# Patient Record
Sex: FEMALE | Race: WHITE | Employment: UNEMPLOYED | URBAN - METROPOLITAN AREA
[De-identification: names, ages, dates, MRNs, and addresses within clinical notes are randomized per-mention and may not be internally consistent; named-entity substitution may affect disease eponyms.]

---

## 2017-01-05 ENCOUNTER — TELEPHONE (OUTPATIENT)
Dept: INTERNAL MEDICINE | Age: 67
End: 2017-01-05

## 2017-01-11 RX ORDER — METHYLPREDNISOLONE SODIUM SUCCINATE 500 MG/8ML
INJECTION INTRAMUSCULAR; INTRAVENOUS
Qty: 500 MG | Refills: 2 | Status: SHIPPED | OUTPATIENT
Start: 2017-01-11 | End: 2017-01-27 | Stop reason: SDUPTHER

## 2017-01-12 ENCOUNTER — TELEPHONE (OUTPATIENT)
Dept: INTERNAL MEDICINE | Age: 67
End: 2017-01-12

## 2017-01-27 ENCOUNTER — TELEPHONE (OUTPATIENT)
Dept: INTERNAL MEDICINE | Age: 67
End: 2017-01-27

## 2017-01-27 RX ORDER — METHYLPREDNISOLONE SODIUM SUCCINATE 500 MG/8ML
INJECTION INTRAMUSCULAR; INTRAVENOUS
Qty: 500 MG | Refills: 2 | Status: SHIPPED | OUTPATIENT
Start: 2017-01-27 | End: 2017-03-14 | Stop reason: ALTCHOICE

## 2017-02-03 ENCOUNTER — HOSPITAL ENCOUNTER (OUTPATIENT)
Dept: ONCOLOGY | Age: 67
Discharge: HOME | End: 2017-02-28
Attending: INTERNAL MEDICINE | Admitting: INTERNAL MEDICINE

## 2017-02-03 VITALS
TEMPERATURE: 98.8 F | SYSTOLIC BLOOD PRESSURE: 124 MMHG | DIASTOLIC BLOOD PRESSURE: 70 MMHG | HEART RATE: 73 BPM | RESPIRATION RATE: 16 BRPM

## 2017-02-09 RX ORDER — 0.9 % SODIUM CHLORIDE 0.9 %
1000 INTRAVENOUS SOLUTION INTRAVENOUS ONCE
Status: DISCONTINUED | OUTPATIENT
Start: 2017-02-10 | End: 2017-02-09 | Stop reason: CLARIF

## 2017-02-10 ENCOUNTER — HOSPITAL ENCOUNTER (OUTPATIENT)
Dept: ONCOLOGY | Age: 67
Discharge: HOME | End: 2017-02-10
Attending: INTERNAL MEDICINE | Admitting: INTERNAL MEDICINE

## 2017-02-17 ENCOUNTER — HOSPITAL ENCOUNTER (OUTPATIENT)
Dept: ONCOLOGY | Age: 67
Discharge: HOME | End: 2017-02-17
Attending: INTERNAL MEDICINE | Admitting: INTERNAL MEDICINE

## 2017-02-17 VITALS
TEMPERATURE: 97 F | RESPIRATION RATE: 16 BRPM | SYSTOLIC BLOOD PRESSURE: 123 MMHG | DIASTOLIC BLOOD PRESSURE: 78 MMHG | HEART RATE: 68 BPM

## 2017-03-14 ENCOUNTER — OFFICE VISIT (OUTPATIENT)
Dept: ENDOCRINOLOGY | Age: 67
End: 2017-03-14

## 2017-03-14 VITALS
BODY MASS INDEX: 20.75 KG/M2 | SYSTOLIC BLOOD PRESSURE: 135 MMHG | WEIGHT: 126.6 LBS | RESPIRATION RATE: 16 BRPM | OXYGEN SATURATION: 98 % | DIASTOLIC BLOOD PRESSURE: 78 MMHG

## 2017-03-14 DIAGNOSIS — E05.00 GRAVES' EYE DISEASE: ICD-10-CM

## 2017-03-14 DIAGNOSIS — E89.0 HYPOTHYROIDISM FOLLOWING RADIOIODINE THERAPY: Primary | ICD-10-CM

## 2017-03-14 PROCEDURE — 99213 OFFICE O/P EST LOW 20 MIN: CPT | Performed by: INTERNAL MEDICINE

## 2017-03-14 RX ORDER — LEVOTHYROXINE SODIUM 0.05 MG/1
50 TABLET ORAL DAILY
COMMUNITY
End: 2017-09-18 | Stop reason: SDUPTHER

## 2017-03-14 RX ORDER — LEVOTHYROXINE SODIUM 0.07 MG/1
75 TABLET ORAL DAILY
COMMUNITY
End: 2017-09-18 | Stop reason: SDUPTHER

## 2017-03-20 PROBLEM — E78.5 HYPERLIPIDEMIA: Status: ACTIVE | Noted: 2017-03-20

## 2017-03-28 ENCOUNTER — TELEPHONE (OUTPATIENT)
Dept: INTERNAL MEDICINE | Age: 67
End: 2017-03-28

## 2017-04-12 ENCOUNTER — OFFICE VISIT (OUTPATIENT)
Dept: INTERNAL MEDICINE | Age: 67
End: 2017-04-12

## 2017-04-12 ENCOUNTER — TELEPHONE (OUTPATIENT)
Dept: INTERNAL MEDICINE | Age: 67
End: 2017-04-12

## 2017-04-12 VITALS
DIASTOLIC BLOOD PRESSURE: 80 MMHG | HEIGHT: 66 IN | BODY MASS INDEX: 20.89 KG/M2 | WEIGHT: 130 LBS | SYSTOLIC BLOOD PRESSURE: 122 MMHG

## 2017-04-12 DIAGNOSIS — E05.00 GRAVES' EYE DISEASE: Primary | ICD-10-CM

## 2017-04-12 PROCEDURE — 99213 OFFICE O/P EST LOW 20 MIN: CPT | Performed by: INTERNAL MEDICINE

## 2017-04-12 RX ORDER — METHYLPREDNISOLONE SODIUM SUCCINATE 500 MG/8ML
250 INJECTION INTRAMUSCULAR; INTRAVENOUS WEEKLY
Qty: 250 MG | Refills: 0
Start: 2017-04-12 | End: 2017-04-13 | Stop reason: CLARIF

## 2017-04-13 RX ORDER — METHYLPREDNISOLONE SODIUM SUCCINATE 500 MG/8ML
INJECTION INTRAMUSCULAR; INTRAVENOUS
Qty: 500 MG | Refills: 0 | Status: SHIPPED | OUTPATIENT
Start: 2017-04-13 | End: 2017-07-14 | Stop reason: ALTCHOICE

## 2017-04-26 ENCOUNTER — HOSPITAL ENCOUNTER (OUTPATIENT)
Dept: ONCOLOGY | Age: 67
Discharge: HOME | End: 2017-04-30
Attending: INTERNAL MEDICINE | Admitting: INTERNAL MEDICINE

## 2017-04-28 ENCOUNTER — TELEPHONE (OUTPATIENT)
Dept: INTERNAL MEDICINE | Age: 67
End: 2017-04-28

## 2017-05-03 ENCOUNTER — HOSPITAL ENCOUNTER (OUTPATIENT)
Dept: ONCOLOGY | Age: 67
Discharge: HOME | End: 2017-05-03
Attending: INTERNAL MEDICINE | Admitting: INTERNAL MEDICINE

## 2017-05-03 VITALS
RESPIRATION RATE: 16 BRPM | DIASTOLIC BLOOD PRESSURE: 71 MMHG | HEART RATE: 87 BPM | TEMPERATURE: 97.8 F | SYSTOLIC BLOOD PRESSURE: 130 MMHG

## 2017-05-10 ENCOUNTER — HOSPITAL ENCOUNTER (OUTPATIENT)
Dept: ONCOLOGY | Age: 67
Discharge: HOME | End: 2017-05-10
Attending: INTERNAL MEDICINE | Admitting: INTERNAL MEDICINE

## 2017-05-10 VITALS
SYSTOLIC BLOOD PRESSURE: 123 MMHG | DIASTOLIC BLOOD PRESSURE: 72 MMHG | RESPIRATION RATE: 18 BRPM | TEMPERATURE: 97.2 F | HEART RATE: 73 BPM

## 2017-05-12 PROBLEM — H05.89 THYROID ASSOCIATED OPHTHALMOPATHY: Status: ACTIVE | Noted: 2017-05-12

## 2017-05-12 PROBLEM — E07.9 THYROID ASSOCIATED OPHTHALMOPATHY: Status: ACTIVE | Noted: 2017-05-12

## 2017-05-17 ENCOUNTER — HOSPITAL ENCOUNTER (OUTPATIENT)
Dept: ONCOLOGY | Age: 67
Discharge: HOME | End: 2017-05-17
Attending: INTERNAL MEDICINE | Admitting: INTERNAL MEDICINE

## 2017-05-17 VITALS
SYSTOLIC BLOOD PRESSURE: 122 MMHG | HEART RATE: 69 BPM | DIASTOLIC BLOOD PRESSURE: 67 MMHG | TEMPERATURE: 98.7 F | RESPIRATION RATE: 16 BRPM | OXYGEN SATURATION: 98 %

## 2017-05-17 RX ORDER — METHYLPREDNISOLONE SODIUM SUCCINATE 125 MG/2ML
250 INJECTION, POWDER, LYOPHILIZED, FOR SOLUTION INTRAMUSCULAR; INTRAVENOUS EVERY 6 HOURS
Status: DISCONTINUED | OUTPATIENT
Start: 2017-05-17 | End: 2017-05-17 | Stop reason: DRUGHIGH

## 2017-07-14 ENCOUNTER — OFFICE VISIT (OUTPATIENT)
Dept: ENDOCRINOLOGY | Age: 67
End: 2017-07-14

## 2017-07-14 VITALS
WEIGHT: 129 LBS | HEART RATE: 71 BPM | DIASTOLIC BLOOD PRESSURE: 71 MMHG | OXYGEN SATURATION: 97 % | RESPIRATION RATE: 16 BRPM | BODY MASS INDEX: 20.73 KG/M2 | HEIGHT: 66 IN | SYSTOLIC BLOOD PRESSURE: 123 MMHG

## 2017-07-14 DIAGNOSIS — E05.00 GRAVES' EYE DISEASE: ICD-10-CM

## 2017-07-14 DIAGNOSIS — E89.0 HYPOTHYROIDISM FOLLOWING RADIOIODINE THERAPY: Primary | ICD-10-CM

## 2017-07-14 PROCEDURE — 99213 OFFICE O/P EST LOW 20 MIN: CPT | Performed by: INTERNAL MEDICINE

## 2017-09-18 RX ORDER — LEVOTHYROXINE SODIUM 0.05 MG/1
TABLET ORAL
Qty: 45 TABLET | Refills: 3 | Status: SHIPPED | OUTPATIENT
Start: 2017-09-18 | End: 2018-09-10 | Stop reason: SDUPTHER

## 2017-09-18 RX ORDER — LEVOTHYROXINE SODIUM 0.07 MG/1
TABLET ORAL
Qty: 45 TABLET | Refills: 3 | Status: SHIPPED | OUTPATIENT
Start: 2017-09-18 | End: 2018-09-10 | Stop reason: SDUPTHER

## 2017-11-10 DIAGNOSIS — E78.00 PURE HYPERCHOLESTEROLEMIA: ICD-10-CM

## 2017-11-13 RX ORDER — ATORVASTATIN CALCIUM 20 MG/1
TABLET, FILM COATED ORAL
Qty: 90 TABLET | Refills: 3 | Status: SHIPPED | OUTPATIENT
Start: 2017-11-13 | End: 2018-11-12 | Stop reason: SDUPTHER

## 2017-11-14 ENCOUNTER — OFFICE VISIT (OUTPATIENT)
Dept: ENDOCRINOLOGY | Age: 67
End: 2017-11-14

## 2017-11-14 VITALS
DIASTOLIC BLOOD PRESSURE: 84 MMHG | RESPIRATION RATE: 16 BRPM | HEIGHT: 66 IN | BODY MASS INDEX: 21.05 KG/M2 | WEIGHT: 131 LBS | HEART RATE: 88 BPM | OXYGEN SATURATION: 96 % | SYSTOLIC BLOOD PRESSURE: 130 MMHG

## 2017-11-14 DIAGNOSIS — E89.0 HYPOTHYROIDISM FOLLOWING RADIOIODINE THERAPY: Primary | ICD-10-CM

## 2017-11-14 PROCEDURE — 99213 OFFICE O/P EST LOW 20 MIN: CPT | Performed by: INTERNAL MEDICINE

## 2017-11-14 NOTE — PROGRESS NOTES
Subjective:      58 y/o WF who is here for thyroid evaluation. She was diagnosed  with Graves disease.     Interim: Eyes puffiness/redness, no pain, no vision changes  He saw Dr. Ada Resendiz, had IV steroids, had some improvement in eyes  Iv steroids 5/17    TFT:  5/15 TSH <0.01 FT4 2.1  4/15 TSH <0.01 FT4 3.6 FT3 11.9  TPO -ve    10/14 TSH 10.99    4/15 Thyroid scan : 70% uptake, diffuse    She had 10mci on 5/1/15    Initial  complaints: Eye swelling-initial right  Now both, , no palpitations, had weight loss, no tremors  History of obstructive symptoms:  difficulty swallowing No, changes in voice/hoarseness  No.    6/15 TSH 0.010 FT4 1.09 FT3 2.13  6/15 Trab >40  8/15  TSH  >100 FT4 0.1  FT3 0.39  Started levothyroxine 100mcg  10/15 TSH 0.103 FT4 2.2     12/15 TSH 0.053 FT4 1.6  75mcg levothyroxine  3/16 TSH 17.8 FT 1.29  50mcg  6/16 TSH 2.3  75mcg alternate with 50mcg  8/16 TSH 2.1   12/16 TSH 2.0  3/17 TSH 1.96  7/17 TSH 3.89  11/17 TSH 3.8    5/16  Left Femoral Neck  -1.8   L1-L4 -1.5   Right Fem Neck -2.1    Frax 9.7  / 1.6%      Past Medical History:   Diagnosis Date    AK (actinic keratosis) 6/9/2010    Angioma 6/9/2010    [address] cancer [address] [address] 6/9/2010    Hemangioma 6/9/2010    Hyperthyroidism     underwent radioactive ablation treatment    Hypothyroidism following radioiodine therapy     Migraine 6/9/2010    Migraines     Personal history of malignant neoplasm of large intestine     S/P colectomy     S/P partial hysterectomy 1996    Shingles     On face 7/15    Skin cancer     Skin tags 6/9/2010     Past Surgical History:   Procedure Laterality Date    COLECTOMY      HYSTERECTOMY       Current Outpatient Prescriptions   Medication Sig Dispense Refill    atorvastatin (LIPITOR) 20 MG tablet TAKE 1 TABLET EVERY DAY 90 tablet 3    levothyroxine (SYNTHROID) 75 MCG tablet TAKE 1 TABLET EVERY OTHER DAY 45 tablet 3    levothyroxine (SYNTHROID) 50 MCG tablet TAKE 1 TABLET EVERY OTHER DAY 45 tablet 3  SUMAtriptan (IMITREX) 100 MG tablet Take 100 mg by mouth once as needed for Migraine      therapeutic multivitamin-minerals (THERAGRAN-M) tablet Take 1 tablet by mouth daily.  calcium citrate (CALCITRATE) 950 MG tablet Take 1 tablet by mouth daily.  Aspirin (ASPIR-81 PO) Take by mouth 1 pill twice weekly      SUMAtriptan (IMITREX) 100 MG tablet Take 1 tablet by mouth once as needed for Migraine 9 tablet 5     No current facility-administered medications for this visit. Review of Systems  Constitutional: - loss and malaise/fatigue. Negative for fever and chills. Eyes: stable, +puffiness  Cardiovascular: Negative for chest pain, palpitations and leg swelling. Gastrointestinal: Negative for nausea, vomiting and abdominal pain. Musculoskeletal: Negative for back pain. No joint pain hand swelling  Skin: Negative for itching and rash. Neurological: Negative for dizziness. Negative for tingling, tremors  Endo/Heme/Allergies: +Hair loss improved,no  weight loss  Psychiatric/Behavioral: Negative for depression and substance abuse. Objective:      /84 (Site: Left Arm, Position: Sitting, Cuff Size: Medium Adult)   Pulse 88   Resp 16   Ht 5' 6\" (1.676 m)   Wt 131 lb (59.4 kg)   SpO2 96%   Breastfeeding? No   BMI 21.14 kg/m²   Wt Readings from Last 3 Encounters:   11/14/17 131 lb (59.4 kg)   07/14/17 129 lb (58.5 kg)   04/12/17 130 lb (59 kg)       Constitutional: Well-developed, appears stated age, cooperative, in no acute distress  H/E/N/M/T:atraumatic, normocephalic, external ears, nose, lips normal without lesions   Eyes: Eyes red, +watery, extraocular movements intact. Periorbital puffiness+redness  Neck: supple, symmetrical, trachea midline   Thyroid: gland size normal  Skin: No obvious rashes or lesions present.   Psychiatric: Judgement and Insight:  judgement and insight appear normal  Neuro: Normal without focal findings, speech is normal normal slow reflexes  No

## 2018-05-15 ENCOUNTER — TELEPHONE (OUTPATIENT)
Dept: ENDOCRINOLOGY | Age: 68
End: 2018-05-15

## 2018-05-15 ENCOUNTER — OFFICE VISIT (OUTPATIENT)
Dept: ENDOCRINOLOGY | Age: 68
End: 2018-05-15

## 2018-05-15 VITALS
HEIGHT: 66 IN | HEART RATE: 67 BPM | DIASTOLIC BLOOD PRESSURE: 73 MMHG | SYSTOLIC BLOOD PRESSURE: 140 MMHG | BODY MASS INDEX: 20.86 KG/M2 | WEIGHT: 129.8 LBS | RESPIRATION RATE: 16 BRPM | OXYGEN SATURATION: 98 %

## 2018-05-15 DIAGNOSIS — E89.0 HYPOTHYROIDISM FOLLOWING RADIOIODINE THERAPY: Primary | ICD-10-CM

## 2018-05-15 PROCEDURE — 99213 OFFICE O/P EST LOW 20 MIN: CPT | Performed by: INTERNAL MEDICINE

## 2018-09-10 RX ORDER — LEVOTHYROXINE SODIUM 0.05 MG/1
TABLET ORAL
Qty: 45 TABLET | Refills: 3 | Status: SHIPPED | OUTPATIENT
Start: 2018-09-10 | End: 2019-01-24

## 2018-09-10 RX ORDER — LEVOTHYROXINE SODIUM 0.07 MG/1
TABLET ORAL
Qty: 45 TABLET | Refills: 3 | Status: SHIPPED | OUTPATIENT
Start: 2018-09-10 | End: 2019-01-23 | Stop reason: SDUPTHER

## 2018-09-10 NOTE — TELEPHONE ENCOUNTER
Pt needs scripts sent to [facility] mail order     Levothyroxine 50 mcg and 75 mcg

## 2018-10-05 ENCOUNTER — TELEPHONE (OUTPATIENT)
Dept: INTERNAL MEDICINE CLINIC | Age: 68
End: 2018-10-05

## 2018-10-05 DIAGNOSIS — Z00.00 WELL ADULT EXAM: Primary | ICD-10-CM

## 2018-10-05 NOTE — TELEPHONE ENCOUNTER
Pt is having a POV on 12. 3.18, pt would like bl wk orders placed and mailed out to her prior to the appt.  Pt will be going to [employer]

## 2018-11-12 DIAGNOSIS — E78.00 PURE HYPERCHOLESTEROLEMIA: ICD-10-CM

## 2018-11-13 ENCOUNTER — OFFICE VISIT (OUTPATIENT)
Dept: ENDOCRINOLOGY | Age: 68
End: 2018-11-13
Payer: COMMERCIAL

## 2018-11-13 VITALS
DIASTOLIC BLOOD PRESSURE: 72 MMHG | HEIGHT: 66 IN | HEART RATE: 72 BPM | RESPIRATION RATE: 16 BRPM | BODY MASS INDEX: 20.76 KG/M2 | OXYGEN SATURATION: 98 % | SYSTOLIC BLOOD PRESSURE: 139 MMHG | WEIGHT: 129.2 LBS

## 2018-11-13 DIAGNOSIS — E89.0 HYPOTHYROIDISM FOLLOWING RADIOIODINE THERAPY: Primary | ICD-10-CM

## 2018-11-13 DIAGNOSIS — E05.00 GRAVES' EYE DISEASE: ICD-10-CM

## 2018-11-13 PROCEDURE — 99213 OFFICE O/P EST LOW 20 MIN: CPT | Performed by: INTERNAL MEDICINE

## 2018-11-13 RX ORDER — ATORVASTATIN CALCIUM 20 MG/1
TABLET, FILM COATED ORAL
Qty: 90 TABLET | Refills: 3 | Status: SHIPPED | OUTPATIENT
Start: 2018-11-13

## 2018-12-03 ENCOUNTER — OFFICE VISIT (OUTPATIENT)
Dept: INTERNAL MEDICINE CLINIC | Age: 68
End: 2018-12-03
Payer: COMMERCIAL

## 2018-12-03 VITALS
BODY MASS INDEX: 20.73 KG/M2 | HEIGHT: 66 IN | WEIGHT: 129 LBS | DIASTOLIC BLOOD PRESSURE: 78 MMHG | SYSTOLIC BLOOD PRESSURE: 140 MMHG

## 2018-12-03 DIAGNOSIS — Z01.818 PRE-OP EXAM: Primary | ICD-10-CM

## 2018-12-03 DIAGNOSIS — E78.00 PURE HYPERCHOLESTEROLEMIA: ICD-10-CM

## 2018-12-03 DIAGNOSIS — E89.0 HYPOTHYROIDISM FOLLOWING RADIOIODINE THERAPY: ICD-10-CM

## 2018-12-03 DIAGNOSIS — Z12.31 SCREENING MAMMOGRAM, ENCOUNTER FOR: ICD-10-CM

## 2018-12-03 PROCEDURE — 99214 OFFICE O/P EST MOD 30 MIN: CPT | Performed by: HOSPITALIST

## 2018-12-03 ASSESSMENT — ENCOUNTER SYMPTOMS
ALLERGIC/IMMUNOLOGIC NEGATIVE: 1
GASTROINTESTINAL NEGATIVE: 1

## 2018-12-03 ASSESSMENT — PATIENT HEALTH QUESTIONNAIRE - PHQ9
SUM OF ALL RESPONSES TO PHQ QUESTIONS 1-9: 0
1. LITTLE INTEREST OR PLEASURE IN DOING THINGS: 0
2. FEELING DOWN, DEPRESSED OR HOPELESS: 0
SUM OF ALL RESPONSES TO PHQ QUESTIONS 1-9: 0
SUM OF ALL RESPONSES TO PHQ9 QUESTIONS 1 & 2: 0

## 2018-12-03 NOTE — PROGRESS NOTES
Authorizing Provider   atorvastatin (LIPITOR) 20 MG tablet TAKE 1 TABLET EVERY DAY 11/13/18  Yes Beryle Boyers, MD   levothyroxine (SYNTHROID) 75 MCG tablet TAKE 1 TABLET EVERY OTHER DAY 9/10/18  Yes Golden Oliveira MD   levothyroxine (SYNTHROID) 50 MCG tablet TAKE 1 TABLET EVERY OTHER DAY 9/10/18  Yes Golden Oliveira MD   SUMAtriptan (IMITREX) 100 MG tablet Take 100 mg by mouth once as needed for Migraine   Yes Historical Provider, MD   therapeutic multivitamin-minerals (THERAGRAN-M) tablet Take 1 tablet by mouth daily. Yes Historical Provider, MD   calcium citrate (CALCITRATE) 950 MG tablet Take 1 tablet by mouth daily. Yes Historical Provider, MD   Aspirin (ASPIR-81 PO) Take by mouth 1 pill twice weekly   Yes Historical Provider, MD       REVIEW OF SYSTEMS:  Review of Systems   Constitutional: Negative. HENT: Negative. Eyes: Positive for visual disturbance. Cardiovascular: Negative. Gastrointestinal: Negative. Endocrine: Negative. Genitourinary: Negative. Musculoskeletal: Negative. Allergic/Immunologic: Negative. Neurological: Negative. Psychiatric/Behavioral: Negative. Physical Exam:      Vitals: BP (!) 140/78   Ht 5' 6\" (1.676 m)   Wt 129 lb (58.5 kg)   LMP  (LMP Unknown)   BMI 20.82 kg/m²     Body mass index is 20.82 kg/m². Wt Readings from Last 3 Encounters:   12/03/18 129 lb (58.5 kg)   11/13/18 129 lb 3.2 oz (58.6 kg)   05/15/18 129 lb 12.8 oz (58.9 kg)     Physical Exam   Constitutional: She is oriented to person, place, and time. She appears well-developed and well-nourished. No distress. HENT:   Head: Normocephalic and atraumatic. Right Ear: External ear normal.   Left Ear: External ear normal.   Nose: Nose normal.   Mouth/Throat: Oropharynx is clear and moist. No oropharyngeal exudate. Eyes: Pupils are equal, round, and reactive to light. Conjunctivae and EOM are normal. No scleral icterus. Neck: Normal range of motion. Neck supple.  No JVD

## 2018-12-04 ENCOUNTER — TELEPHONE (OUTPATIENT)
Dept: ENDOCRINOLOGY | Age: 68
End: 2018-12-04

## 2018-12-07 ENCOUNTER — TELEPHONE (OUTPATIENT)
Dept: INTERNAL MEDICINE CLINIC | Age: 68
End: 2018-12-07

## 2019-01-23 ENCOUNTER — TELEPHONE (OUTPATIENT)
Dept: ENDOCRINOLOGY | Age: 69
End: 2019-01-23

## 2019-01-23 RX ORDER — LEVOTHYROXINE SODIUM 0.07 MG/1
TABLET ORAL
Qty: 90 TABLET | Refills: 1 | Status: SHIPPED | OUTPATIENT
Start: 2019-01-23 | End: 2019-01-24 | Stop reason: SDUPTHER

## 2019-01-23 RX ORDER — LEVOTHYROXINE SODIUM 0.07 MG/1
TABLET ORAL
Qty: 45 TABLET | Refills: 3 | Status: SHIPPED | OUTPATIENT
Start: 2019-01-23 | End: 2019-01-23 | Stop reason: SDUPTHER

## 2019-01-24 RX ORDER — LEVOTHYROXINE SODIUM 0.07 MG/1
TABLET ORAL
Qty: 90 TABLET | Refills: 1 | Status: SHIPPED | OUTPATIENT
Start: 2019-01-24 | End: 2019-07-22 | Stop reason: SDUPTHER

## 2019-04-23 ENCOUNTER — TELEPHONE (OUTPATIENT)
Dept: ENDOCRINOLOGY | Age: 69
End: 2019-04-23

## 2019-04-23 NOTE — TELEPHONE ENCOUNTER
Patient called and said that her new internist, Dr. Doreen Art did not labs on her and found that her thyroid was slightly low, at 0.217. She wanted to let Dr. Clement Corado know in case this changes her medication or if she needs to be seen sooner. She also left Dr. Azar Balloon number in case it is needed, it is [phone].

## 2019-07-22 RX ORDER — LEVOTHYROXINE SODIUM 0.07 MG/1
TABLET ORAL
Qty: 90 TABLET | Refills: 1 | Status: SHIPPED | OUTPATIENT
Start: 2019-07-22 | End: 2020-01-20

## 2019-11-13 ENCOUNTER — OFFICE VISIT (OUTPATIENT)
Dept: ENDOCRINOLOGY | Age: 69
End: 2019-11-13
Payer: COMMERCIAL

## 2019-11-13 VITALS
HEIGHT: 66 IN | SYSTOLIC BLOOD PRESSURE: 129 MMHG | BODY MASS INDEX: 20.73 KG/M2 | HEART RATE: 71 BPM | WEIGHT: 129 LBS | OXYGEN SATURATION: 98 % | DIASTOLIC BLOOD PRESSURE: 71 MMHG

## 2019-11-13 DIAGNOSIS — E05.00 GRAVES DISEASE: ICD-10-CM

## 2019-11-13 DIAGNOSIS — E03.9 ACQUIRED HYPOTHYROIDISM: Primary | ICD-10-CM

## 2019-11-13 PROCEDURE — 99213 OFFICE O/P EST LOW 20 MIN: CPT | Performed by: INTERNAL MEDICINE

## 2020-01-20 RX ORDER — LEVOTHYROXINE SODIUM 0.07 MG/1
TABLET ORAL
Qty: 90 TABLET | Refills: 1 | Status: SHIPPED | OUTPATIENT
Start: 2020-01-20 | End: 2020-08-03

## 2020-08-03 RX ORDER — LEVOTHYROXINE SODIUM 0.07 MG/1
TABLET ORAL
Qty: 90 TABLET | Refills: 1 | Status: SHIPPED | OUTPATIENT
Start: 2020-08-03 | End: 2020-11-09

## 2020-11-09 ENCOUNTER — OFFICE VISIT (OUTPATIENT)
Dept: ENDOCRINOLOGY | Age: 70
End: 2020-11-09
Payer: COMMERCIAL

## 2020-11-09 VITALS
SYSTOLIC BLOOD PRESSURE: 143 MMHG | HEIGHT: 66 IN | BODY MASS INDEX: 20.93 KG/M2 | DIASTOLIC BLOOD PRESSURE: 76 MMHG | HEART RATE: 71 BPM | WEIGHT: 130.2 LBS | RESPIRATION RATE: 18 BRPM | OXYGEN SATURATION: 96 %

## 2020-11-09 PROCEDURE — 99213 OFFICE O/P EST LOW 20 MIN: CPT | Performed by: INTERNAL MEDICINE

## 2020-11-09 RX ORDER — LEVOTHYROXINE SODIUM 0.07 MG/1
TABLET ORAL
Qty: 90 TABLET | Refills: 1 | Status: CANCELLED | OUTPATIENT
Start: 2020-11-09

## 2020-11-09 RX ORDER — LEVOTHYROXINE SODIUM 0.05 MG/1
50 TABLET ORAL DAILY
Qty: 90 TABLET | Refills: 1 | Status: SHIPPED | OUTPATIENT
Start: 2020-11-09 | End: 2021-04-29

## 2020-11-09 NOTE — PROGRESS NOTES
Subjective:      60 y/o WF who is here for thyroid evaluation. She was diagnosed  with Graves disease.     Interim: Eyes puffiness/redness is stable   no pain, no vision changes       saw Dr. Nam Krueger, had IV steroids, had some improvement in eyes  Iv steroids 5/17    TFT:  5/15 TSH <0.01 FT4 2.1  4/15 TSH <0.01 FT4 3.6 FT3 11.9  TPO -ve    10/14 TSH 10.99    4/15 Thyroid scan : 70% uptake, diffuse    She had 10mci on 5/1/15    Initial  complaints: Eye swelling-initial right  Now both, , no palpitations, had weight loss, no tremors  History of obstructive symptoms:  difficulty swallowing No, changes in voice/hoarseness  No.    She has post ablative hypothyroidism    Mild, controlled    No worsening factors    6/15 TSH 0.010 FT4 1.09 FT3 2.13  6/15 Trab >40  8/15  TSH  >100 FT4 0.1  FT3 0.39  Started levothyroxine 100mcg  10/15 TSH 0.103 FT4 2.2     12/15 TSH 0.053 FT4 1.6  75mcg levothyroxine  3/16 TSH 17.8 FT 1.29  50mcg  6/16 TSH 2.3  75mcg alternate with 50mcg  8/16 TSH 2.1   12/16 TSH 2.0  3/17 TSH 1.96  7/17 TSH 3.89  11/17 TSH 3.8  5/18 TSH 3.32  11/18 TSH 4.94   On 75mcg daily  4/19 TSH 0.217  10/19 TSH 0.28 nl  11/19 TSH 0.243 L    She has osteopenia    5/16  Left Femoral Neck  -1.8   L1-L4 -1.5   Right Fem Neck -2.1    Frax 9.7  / 1.6%      Past Medical History:   Diagnosis Date    AK (actinic keratosis) 6/9/2010    Angioma 6/9/2010    Colon cancer ([facility] [address] 6/9/2010    Hemangioma 6/9/2010    Hyperthyroidism     underwent radioactive ablation treatment    Hypothyroidism following radioiodine therapy     Hypothyroidism following radioiodine therapy 8/31/2015    Migraine 6/9/2010    Migraines     Personal history of malignant neoplasm of large intestine     S/P colectomy     S/P partial hysterectomy 1996    Shingles     On face 7/15    Skin cancer     Skin tags 6/9/2010     Past Surgical History:   Procedure Laterality Date    COLECTOMY      HYSTERECTOMY       Current Outpatient Medications Medication Sig Dispense Refill    levothyroxine (SYNTHROID) 75 MCG tablet TAKE 1 TABLET EVERY DAY 90 tablet 1    atorvastatin (LIPITOR) 20 MG tablet TAKE 1 TABLET EVERY DAY 90 tablet 3    SUMAtriptan (IMITREX) 100 MG tablet Take 100 mg by mouth once as needed for Migraine      therapeutic multivitamin-minerals (THERAGRAN-M) tablet Take 1 tablet by mouth daily.  calcium citrate (CALCITRATE) 950 MG tablet Take 1 tablet by mouth daily.  Aspirin (ASPIR-81 PO) Take by mouth 1 pill twice weekly       No current facility-administered medications for this visit. Review of Systems  Reviewed and scanned     Objective:      LMP  (LMP Unknown)   Wt Readings from Last 3 Encounters:   11/13/19 129 lb (58.5 kg)   12/03/18 129 lb (58.5 kg)   11/13/18 129 lb 3.2 oz (58.6 kg)     Vitals:    11/09/20 1100   BP: (!) 143/76   Pulse: 71   Resp: 18   SpO2: 96%       Constitutional: Well-developed, appears stated age, cooperative, in no acute distress  H/E/N/M/T:atraumatic, normocephalic, external ears, nose, lips normal without lesions   Eyes:extraocular movements intact. Periorbital puffiness+redness  Neck: supple, symmetrical, trachea midline   Thyroid: gland size normal  Skin: No obvious rashes or lesions present. Psychiatric: Judgement and Insight:  judgement and insight appear normal  Neuro: Normal without focal findings, speech is normal normal slow reflexes  No tremors    Lab Review  Lab Results   Component Value Date    TSH 0.216 11/03/2020     No results found for: FREET4      Assessment: 1. Graves Disease: S/p Ablation, discussed natural course and pathogenesis. She received 10mci, free level improved. on replacement,Subclinical hyperthyroid, will adjust dose  2. Graves Eye disease: Now aggravating, She has seen eye surgeon Dr. Aimee Obando. Has been told due to [id number]. They had her on IV steroids, now stable. Selenium did not help  3. Osteopenia: Vit D 34 on 8/16,  calcium supplement 630 +500mg daily,dietary 600-800mg daily,  Vit D 800+500, frax risk is low. 4.Hair loss: Advised biotin    Plan: 1. Repeat TFT in 12 months. She lives in [place], will have her see Dr. Ehsan Cam or Dr. Bianca Kirkpatrick due to location  2. Levothyroxine 50mcg daily  3. Dexa next year

## 2021-04-29 RX ORDER — LEVOTHYROXINE SODIUM 0.05 MG/1
TABLET ORAL
Qty: 90 TABLET | Refills: 1 | Status: SHIPPED | OUTPATIENT
Start: 2021-04-29 | End: 2021-10-25

## 2021-04-29 NOTE — TELEPHONE ENCOUNTER
Medication:   Requested Prescriptions     Pending Prescriptions Disp Refills    levothyroxine (SYNTHROID) 50 MCG tablet [Pharmacy Med Name: LEVOTHYROXINE SODIUM 50 MCG Tablet] 90 tablet 1     Sig: TAKE 1 TABLET EVERY DAY       Last Filled:      Patient Phone Number: [phone] (home)     Last appt: 11/9/2020   Next appt: Visit date not found    Last Thyroid:   Lab Results   Component Value Date    TSH 0.216 11/03/2020    FT3 2.13 06/22/2015    T4FREE 1.66 11/03/2020

## 2021-10-23 DIAGNOSIS — E03.9 ACQUIRED HYPOTHYROIDISM: Primary | ICD-10-CM

## 2021-10-25 RX ORDER — LEVOTHYROXINE SODIUM 0.05 MG/1
TABLET ORAL
Qty: 30 TABLET | Refills: 0 | Status: SHIPPED | OUTPATIENT
Start: 2021-10-25 | End: 2021-11-10

## 2021-10-25 NOTE — TELEPHONE ENCOUNTER
Medication:   Requested Prescriptions     Pending Prescriptions Disp Refills    levothyroxine (SYNTHROID) 50 MCG tablet [Pharmacy Med Name: LEVOTHYROXINE SODIUM 50 MCG Tablet] 30 tablet 0     Sig: TAKE 1 TABLET EVERY DAY       Last Filled:  7/30/2021    Patient Phone Number: [phone] (home)     Last appt: 11/9/2020   Next appt: 11/10/2021    Last Thyroid:   Lab Results   Component Value Date    TSH 0.216 11/03/2020    FT3 2.13 06/22/2015    T4FREE 1.66 11/03/2020

## 2021-11-10 ENCOUNTER — OFFICE VISIT (OUTPATIENT)
Dept: ENDOCRINOLOGY | Age: 71
End: 2021-11-10
Payer: COMMERCIAL

## 2021-11-10 VITALS
HEIGHT: 66 IN | BODY MASS INDEX: 21.15 KG/M2 | OXYGEN SATURATION: 95 % | DIASTOLIC BLOOD PRESSURE: 69 MMHG | SYSTOLIC BLOOD PRESSURE: 131 MMHG | WEIGHT: 131.6 LBS | HEART RATE: 71 BPM

## 2021-11-10 DIAGNOSIS — M85.80 OSTEOPENIA, UNSPECIFIED LOCATION: ICD-10-CM

## 2021-11-10 DIAGNOSIS — E03.9 ACQUIRED HYPOTHYROIDISM: Primary | ICD-10-CM

## 2021-11-10 PROCEDURE — 99214 OFFICE O/P EST MOD 30 MIN: CPT | Performed by: INTERNAL MEDICINE

## 2021-11-10 RX ORDER — LEVOTHYROXINE SODIUM 0.07 MG/1
TABLET ORAL
Qty: 90 TABLET | Refills: 3 | Status: SHIPPED | OUTPATIENT
Start: 2021-11-10 | End: 2022-05-11 | Stop reason: SDUPTHER

## 2021-11-10 NOTE — PROGRESS NOTES
Subjective:      58 y/o WF who is here for thyroid evaluation. Interim:    She was diagnosed  with Graves disease.     Interim: Eyes puffiness/redness is stable   no pain, no vision changes       saw Dr. Lou Reed, had IV steroids, had some improvement in eyes  Iv steroids 5/17    TFT:  5/15 TSH <0.01 FT4 2.1  4/15 TSH <0.01 FT4 3.6 FT3 11.9  TPO -ve    10/14 TSH 10.99    4/15 Thyroid scan : 70% uptake, diffuse    She had 10mci on 5/1/15    Initial  complaints: Eye swelling-initial right  Now both, , no palpitations, had weight loss, no tremors  History of obstructive symptoms:  difficulty swallowing No, changes in voice/hoarseness  No.    She has post ablative hypothyroidism    Mild, controlled    No worsening factors    6/15 TSH 0.010 FT4 1.09 FT3 2.13  6/15 Trab >40  8/15  TSH  >100 FT4 0.1  FT3 0.39  Started levothyroxine 100mcg  10/15 TSH 0.103 FT4 2.2     12/15 TSH 0.053 FT4 1.6  75mcg levothyroxine  3/16 TSH 17.8 FT 1.29  50mcg  6/16 TSH 2.3  75mcg alternate with 50mcg  8/16 TSH 2.1   12/16 TSH 2.0  3/17 TSH 1.96  7/17 TSH 3.89  11/17 TSH 3.8  5/18 TSH 3.32  11/18 TSH 4.94   On 75mcg daily  4/19 TSH 0.217  10/19 TSH 0.28 nl  11/19 TSH 0.243 L  9/21 TSH 23  11/21 TSH 8.8  On 50mcg daily and 2 tab every Sunday    She has osteopenia    5/16  Left Femoral Neck  -1.8   L1-L4 -1.5   Right Fem Neck -2.1    Frax 9.7  / 1.6%      Past Medical History:   Diagnosis Date    AK (actinic keratosis) 6/9/2010    Angioma 6/9/2010    Colon cancer ([facility] [address] 6/9/2010    Hemangioma 6/9/2010    Hyperthyroidism     underwent radioactive ablation treatment    Hypothyroidism following radioiodine therapy     Hypothyroidism following radioiodine therapy 8/31/2015    Migraine 6/9/2010    Migraines     Personal history of malignant neoplasm of large intestine     S/P colectomy     S/P partial hysterectomy 1996    Shingles     On face 7/15    Skin cancer     Skin tags 6/9/2010     Past Surgical History:   Procedure Laterality Date    COLECTOMY      HYSTERECTOMY       Current Outpatient Medications   Medication Sig Dispense Refill    levothyroxine (SYNTHROID) 50 MCG tablet TAKE 1 TABLET EVERY DAY 30 tablet 0    atorvastatin (LIPITOR) 20 MG tablet TAKE 1 TABLET EVERY DAY 90 tablet 3    therapeutic multivitamin-minerals (THERAGRAN-M) tablet Take 1 tablet by mouth daily.  calcium citrate (CALCITRATE) 950 MG tablet Take 1 tablet by mouth daily.  Aspirin (ASPIR-81 PO) Take by mouth 1 pill twice weekly       No current facility-administered medications for this visit. Review of Systems  Reviewed and scanned     Objective:      /69   Pulse 71   Ht 5' 6\" (1.676 m)   Wt 131 lb 9.6 oz (59.7 kg)   LMP  (LMP Unknown)   SpO2 95%   Breastfeeding No   BMI 21.24 kg/m²   Wt Readings from Last 3 Encounters:   11/10/21 131 lb 9.6 oz (59.7 kg)   11/09/20 130 lb 3.2 oz (59.1 kg)   11/13/19 129 lb (58.5 kg)     Vitals:    11/10/21 1055   BP: 131/69   Pulse: 71   SpO2: 95%       Constitutional: Well-developed, appears stated age, cooperative, in no acute distress  H/E/N/M/T:atraumatic, normocephalic, external ears, nose, lips normal without lesions   Eyes:extraocular movements intact. Periorbital puffiness+redness  Neck: supple, symmetrical, trachea midline   Thyroid: gland size normal  Skin: No obvious rashes or lesions present. Psychiatric: Judgement and Insight:  judgement and insight appear normal  Neuro: Normal without focal findings, speech is normal normal slow reflexes  No tremors    Lab Review  Lab Results   Component Value Date    TSH 0.216 11/03/2020     No results found for: FREET4      Assessment: 1. Graves Disease: S/p Ablation, discussed natural course and pathogenesis. She received 10mci, free level improved. on replacement,Subclinical hypothyroid. TSH has improved , adjust dose  2. Graves Eye disease: Now aggravating, She has seen eye surgeon Dr. Nithin Salinas. Has been told due to [phone].  They had her on IV steroids, now stable. Selenium did not help  3. Osteopenia: Vit D 34 on 8/16,  calcium supplement 630 +500mg daily,dietary 600-800mg daily,  Vit D 800+500, frax risk is low. 4.Hair loss: Advised biotin    Plan: 1. Repeat TFT in 12 months. She lives in [place], [place] see Dr. Khushboo Mcneil or Dr. Ramirez More due to location  2. Levothyroxine 75mcg daily and half every sunday  3. Dexa next year  4.  TSH in 6 months and then in a year

## 2022-05-11 ENCOUNTER — TELEPHONE (OUTPATIENT)
Dept: ENDOCRINOLOGY | Age: 72
End: 2022-05-11

## 2022-05-11 ENCOUNTER — OFFICE VISIT (OUTPATIENT)
Dept: ENDOCRINOLOGY | Age: 72
End: 2022-05-11
Payer: COMMERCIAL

## 2022-05-11 VITALS
HEART RATE: 70 BPM | DIASTOLIC BLOOD PRESSURE: 65 MMHG | SYSTOLIC BLOOD PRESSURE: 129 MMHG | BODY MASS INDEX: 20.89 KG/M2 | HEIGHT: 66 IN | WEIGHT: 130 LBS | OXYGEN SATURATION: 98 %

## 2022-05-11 DIAGNOSIS — M85.852 OSTEOPENIA OF NECK OF LEFT FEMUR: ICD-10-CM

## 2022-05-11 DIAGNOSIS — E03.9 ACQUIRED HYPOTHYROIDISM: Primary | ICD-10-CM

## 2022-05-11 DIAGNOSIS — Z13.820 OSTEOPOROSIS SCREENING: ICD-10-CM

## 2022-05-11 PROCEDURE — 99214 OFFICE O/P EST MOD 30 MIN: CPT | Performed by: INTERNAL MEDICINE

## 2022-05-11 RX ORDER — LEVOTHYROXINE SODIUM 0.07 MG/1
TABLET ORAL
Qty: 90 TABLET | Refills: 5 | Status: SHIPPED | OUTPATIENT
Start: 2022-05-11

## 2022-05-11 NOTE — TELEPHONE ENCOUNTER
Patient called and stated that at her appt today Dr Ninoska Boone gave her the impression that she was due for a Dexa scan. She checked her records and found that her last Dexa scan was November of 2021. I checked and her results are in [place]. Patient just wanted to make sure that Dr Ninoska Boone had access to those results.

## 2022-05-11 NOTE — PROGRESS NOTES
Region                        BMD         T-score       Z-score        -----------------------------------------------------------------   AP Spine (L1-L4)              0.844        -1.8           0.3          Femoral Neck (Right)          0.636        -1.9          -0.1          Total Hip (Right)             0.781        -1. 3           0.2          1/3 Radius (Left)             0.596        -1. 6           0.5                  Spine L1-L4   11/24/2021    71    0.844    -1.8        -5.0%*                         05/19/2016    65    0.888    -1.4                          Total Hip(Right)                  11/24/2021    71    0.781    -1. 3        -2.7%                         05/19/2016    65    0.802    -1.1                          1/3 Forearm(Left)                  11/24/2021    71    0.596    -1. 6        -5.2%*                         05/19/2016    65    0.629    -1.1                Frax  10/2.2    Decrease in BMD 5 % at spine , stable at hip, repeat in a year

## 2022-05-11 NOTE — TELEPHONE ENCOUNTER
Please advise patient I was to able to get results and reviewed the scan. It does show some decrease in bone strength at the spine and stable at the hip. I would recommend repeat in year . If persistent decline, we may need to start treatment.

## 2022-05-11 NOTE — PROGRESS NOTES
Subjective:      69 y/o WF who is here for thyroid evaluation. Interim:  Stable  No issues    She was diagnosed  with Graves disease.     Interim: Eyes puffiness/redness is stable   no pain, no vision changes       saw Dr. Holly Negrete, had IV steroids, had some improvement in eyes  Iv steroids 5/17  Did not need to f/u    TFT:  5/15 TSH <0.01 FT4 2.1  4/15 TSH <0.01 FT4 3.6 FT3 11.9  TPO -ve    10/14 TSH 10.99    4/15 Thyroid scan : 70% uptake, diffuse    She had 10mci on 5/1/15    Initial  complaints: Eye swelling-initial right  Now both, , no palpitations, had weight loss, no tremors  History of obstructive symptoms:  difficulty swallowing No, changes in voice/hoarseness  No.    She has post ablative hypothyroidism    Mild, controlled    No worsening factors    6/15 TSH 0.010 FT4 1.09 FT3 2.13  6/15 Trab >40  8/15  TSH  >100 FT4 0.1  FT3 0.39  Started levothyroxine 100mcg  10/15 TSH 0.103 FT4 2.2     12/15 TSH 0.053 FT4 1.6  75mcg levothyroxine  3/16 TSH 17.8 FT 1.29  50mcg  6/16 TSH 2.3  75mcg alternate with 50mcg  8/16 TSH 2.1   12/16 TSH 2.0  3/17 TSH 1.96  7/17 TSH 3.89  11/17 TSH 3.8  5/18 TSH 3.32  11/18 TSH 4.94   On 75mcg daily  4/19 TSH 0.217  10/19 TSH 0.28 nl  11/19 TSH 0.243 L  9/21 TSH 23  11/21 TSH 8.8  On 50mcg daily and 2 tab every Sunday 5/22 TSH 2.65    Levothyroxine 75mcg daily and half every sunday    She has osteopenia    5/16  Left Femoral Neck  -1.8   L1-L4 -1.5   Right Fem Neck -2.1    Frax 9.7  / 1.6%      Past Medical History:   Diagnosis Date    AK (actinic keratosis) 6/9/2010    Angioma 6/9/2010    Colon cancer ([address] 6/9/2010    Hemangioma 6/9/2010    Hyperthyroidism     underwent radioactive ablation treatment    Hypothyroidism following radioiodine therapy     Hypothyroidism following radioiodine therapy 8/31/2015    Migraine 6/9/2010    Migraines     Personal history of malignant neoplasm of large intestine     S/P colectomy     S/P partial hysterectomy 1996    Shingles On face 7/15    Skin cancer     Skin tags 6/9/2010     Past Surgical History:   Procedure Laterality Date    COLECTOMY      HYSTERECTOMY       Current Outpatient Medications   Medication Sig Dispense Refill    levothyroxine (SYNTHROID) 75 MCG tablet One tab daily and half tab every sunday 90 tablet 3    atorvastatin (LIPITOR) 20 MG tablet TAKE 1 TABLET EVERY DAY 90 tablet 3    therapeutic multivitamin-minerals (THERAGRAN-M) tablet Take 1 tablet by mouth daily.  calcium citrate (CALCITRATE) 950 MG tablet Take 1 tablet by mouth daily.  Aspirin (ASPIR-81 PO) Take by mouth 1 pill twice weekly       No current facility-administered medications for this visit. Review of Systems  Reviewed and scanned     Objective:      /65   Pulse 70   Ht 5' 6\" (1.676 m)   Wt 130 lb (59 kg)   LMP  (LMP Unknown)   SpO2 98%   Breastfeeding No   BMI 20.98 kg/m²   Wt Readings from Last 3 Encounters:   05/11/22 130 lb (59 kg)   11/10/21 131 lb 9.6 oz (59.7 kg)   11/09/20 130 lb 3.2 oz (59.1 kg)     Vitals:    05/11/22 1101   BP: 129/65   Pulse: 70   SpO2: 98%       Constitutional: Well-developed, appears stated age, cooperative, in no acute distress  H/E/N/M/T:atraumatic, normocephalic, external ears, nose, lips normal without lesions   Eyes:extraocular movements intact. Periorbital puffiness+redness  Neck: supple, symmetrical, trachea midline   Thyroid: gland size normal  Skin: No obvious rashes or lesions present. Psychiatric: Judgement and Insight:  judgement and insight appear normal  Neuro: Normal without focal findings, speech is normal normal slow reflexes  No tremors    Lab Review  Lab Results   Component Value Date    TSH 0.216 11/03/2020     No results found for: FREET4      Assessment: 1. Graves Disease: S/p Ablation, discussed natural course and pathogenesis. She received 10mci, free level improved. on replacement,Subclinical hypothyroid. TSH has improved ,  Continue same dose  2. Graves Eye disease: Now aggravating, She has seen eye surgeon Dr. Nam Krueger. Has been told due to [id number]. They had her on IV steroids, now stable. Selenium did not help  3. Osteopenia: Vit D 34 on 8/16,  calcium supplement 630 +500mg daily,dietary 600-800mg daily,  Vit D 800+500, frax risk is low. Needs repeat  4. Hair loss: Advised biotin    Plan: 1. Repeat TFT in 12 months. She lives in [place], [place] see Dr. Ventura Lopez or Dr. Sha Leslie due to location  2. Levothyroxine 75mcg daily and half every sunday  3. Dexa due  4.  TSH in a year

## 2022-11-21 ENCOUNTER — PATIENT MESSAGE (OUTPATIENT)
Dept: ENDOCRINOLOGY | Age: 72
End: 2022-11-21

## 2022-11-21 NOTE — TELEPHONE ENCOUNTER
From: Génesis Jean  To: Dr. Elsi Carlton: 11/21/2022 10:52 AM EST  Subject: Do I need to adjust my Levothyroxine dosage? I saw Dr Hans Root last Tuesday & she was going to send my blood work to you. Was it received? She wanted to share it with you/see if you wished to adjust my dosage prior to seeing you next spring. Please let me know & many thanks.   Meet Tru[place]illo

## 2023-05-15 ENCOUNTER — OFFICE VISIT (OUTPATIENT)
Dept: ENDOCRINOLOGY | Age: 73
End: 2023-05-15
Payer: COMMERCIAL

## 2023-05-15 VITALS
RESPIRATION RATE: 14 BRPM | WEIGHT: 128.8 LBS | SYSTOLIC BLOOD PRESSURE: 133 MMHG | DIASTOLIC BLOOD PRESSURE: 72 MMHG | BODY MASS INDEX: 20.7 KG/M2 | OXYGEN SATURATION: 99 % | TEMPERATURE: 97 F | HEIGHT: 66 IN | HEART RATE: 71 BPM

## 2023-05-15 DIAGNOSIS — M85.88 OSTEOPENIA OF LUMBAR SPINE: ICD-10-CM

## 2023-05-15 DIAGNOSIS — E89.0 HYPOTHYROIDISM FOLLOWING RADIOIODINE THERAPY: Primary | ICD-10-CM

## 2023-05-15 DIAGNOSIS — Z13.820 OSTEOPOROSIS SCREENING: ICD-10-CM

## 2023-05-15 PROCEDURE — 1123F ACP DISCUSS/DSCN MKR DOCD: CPT | Performed by: INTERNAL MEDICINE

## 2023-05-15 PROCEDURE — 99214 OFFICE O/P EST MOD 30 MIN: CPT | Performed by: INTERNAL MEDICINE

## 2023-05-15 NOTE — PROGRESS NOTES
kg/m²   Wt Readings from Last 3 Encounters:   05/15/23 128 lb 12.8 oz (58.4 kg)   05/11/22 130 lb (59 kg)   11/10/21 131 lb 9.6 oz (59.7 kg)     Vitals:    05/15/23 0942   BP: 133/72   Pulse: 71   Resp: 14   Temp: 97 °F (36.1 °C)   SpO2: 99%       Constitutional: Well-developed, appears stated age, cooperative, in no acute distress  H/E/N/M/T:atraumatic, normocephalic, external ears, nose, lips normal without lesions   Eyes:extraocular movements intact. Periorbital puffiness+redness  Neck: supple, symmetrical, trachea midline   Thyroid: gland size normal  Skin: No obvious rashes or lesions present. Psychiatric: Judgement and Insight:  judgement and insight appear normal  Neuro: Normal without focal findings, speech is normal normal slow reflexes  No tremors    Lab Review  Lab Results   Component Value Date/Time    TSH 0.216 11/03/2020 02:25 PM     No results found for: [email or web address]      Assessment: 1. Graves Disease: S/p Ablation, discussed natural course and pathogenesis. She received 10mci, free level improved. on replacement,Subclinical hypothyroid. TSH has improved ,  Continue same dose levothyroxine 75mcg  2. Graves Eye disease: Now aggravating, She has seen eye surgeon Dr. Kelli Cervantes. Has been told due to [place] [id number]. They had her on IV steroids, now stable. Selenium did not help  3. Osteopenia: Vit D 34 on 8/16,  calcium supplement 630 +500mg daily,dietary 600-800mg daily,  Vit D 800+500, frax risk is low. Needs repeat  4. Hair loss: Advised biotin    Plan: 1. Repeat TFT in 12 months. She lives in [place], [place] see Dr. Mey Rosario or Dr. Mario Graham due to location  2. Levothyroxine 75mcg daily and half every sunday  3. Dexa due  4.  TSH in a year

## 2023-07-31 RX ORDER — LEVOTHYROXINE SODIUM 0.07 MG/1
TABLET ORAL
Qty: 84 TABLET | Refills: 0 | Status: SHIPPED | OUTPATIENT
Start: 2023-07-31

## 2023-10-16 ENCOUNTER — TELEPHONE (OUTPATIENT)
Dept: ENDOCRINOLOGY | Age: 73
End: 2023-10-16

## 2023-10-16 NOTE — TELEPHONE ENCOUNTER
Pt called stating that she can't get her dexa until 11/27/23 wants to know if she should cancel her appointment on 11/15.  Pt said you can send a message on my chart if nurse wants instead of calling

## 2023-11-14 ENCOUNTER — TELEPHONE (OUTPATIENT)
Dept: ENDOCRINOLOGY | Age: 73
End: 2023-11-14

## 2023-11-14 NOTE — TELEPHONE ENCOUNTER
Pt called to inform her pcp dr has changed. Pt new pcp is Kira Bob. Pt said she talked to the  they said they couldn't schedule due to insurance not covering but she has an appointment.  Pt will call back to reschedule due to family having covid